# Patient Record
Sex: MALE | Race: WHITE
[De-identification: names, ages, dates, MRNs, and addresses within clinical notes are randomized per-mention and may not be internally consistent; named-entity substitution may affect disease eponyms.]

---

## 2017-03-30 ENCOUNTER — HOSPITAL ENCOUNTER (OUTPATIENT)
Dept: HOSPITAL 45 - C.LABBC | Age: 62
Discharge: HOME | End: 2017-03-30
Attending: INTERNAL MEDICINE
Payer: COMMERCIAL

## 2017-03-30 DIAGNOSIS — E78.5: ICD-10-CM

## 2017-03-30 DIAGNOSIS — I25.10: Primary | ICD-10-CM

## 2017-03-30 DIAGNOSIS — I10: ICD-10-CM

## 2017-03-30 LAB
ALBUMIN/GLOB SERPL: 0.9 {RATIO} (ref 0.9–2)
ALP SERPL-CCNC: 106 U/L (ref 45–117)
ALT SERPL-CCNC: 40 U/L (ref 12–78)
ANION GAP SERPL CALC-SCNC: 8 MMOL/L (ref 3–11)
AST SERPL-CCNC: 32 U/L (ref 15–37)
BASOPHILS # BLD: 0.02 K/UL (ref 0–0.2)
BASOPHILS NFR BLD: 0.2 %
BUN SERPL-MCNC: 29 MG/DL (ref 7–18)
BUN SERPL-MCNC: 30 MG/DL (ref 7–18)
BUN/CREAT SERPL: 24.5 (ref 10–20)
CALCIUM SERPL-MCNC: 9.7 MG/DL (ref 8.5–10.1)
CHLORIDE SERPL-SCNC: 105 MMOL/L (ref 98–107)
CHOLEST/HDLC SERPL: 3 {RATIO}
CO2 SERPL-SCNC: 25 MMOL/L (ref 21–32)
COMPLETE: YES
CREAT SERPL-MCNC: 1.2 MG/DL (ref 0.6–1.4)
CREAT SERPL-MCNC: 1.2 MG/DL (ref 0.6–1.4)
EOSINOPHIL NFR BLD AUTO: 283 K/UL (ref 130–400)
GLOBULIN SER-MCNC: 3.9 GM/DL (ref 2.5–4)
GLUCOSE SERPL-MCNC: 93 MG/DL (ref 70–99)
GLUCOSE UR QL: 48 MG/DL
HCT VFR BLD CALC: 41.1 % (ref 42–52)
IG%: 0.2 %
IMM GRANULOCYTES NFR BLD AUTO: 16.4 %
KETONES UR QL STRIP: 79 MG/DL
LYMPHOCYTES # BLD: 1.55 K/UL (ref 1.2–3.4)
MCH RBC QN AUTO: 32.4 PG (ref 25–34)
MCHC RBC AUTO-ENTMCNC: 35 G/DL (ref 32–36)
MCV RBC AUTO: 92.4 FL (ref 80–100)
MONOCYTES NFR BLD: 12.4 %
NEUTROPHILS # BLD AUTO: 0.8 %
NEUTROPHILS NFR BLD AUTO: 70 %
NITRITE UR QL STRIP: 90 MG/DL (ref 0–150)
PH UR: 145 MG/DL (ref 0–200)
PMV BLD AUTO: 11 FL (ref 7.4–10.4)
POTASSIUM SERPL-SCNC: 4.1 MMOL/L (ref 3.5–5.1)
RBC # BLD AUTO: 4.45 M/UL (ref 4.7–6.1)
SODIUM SERPL-SCNC: 138 MMOL/L (ref 136–145)
VERY LOW DENSITY LIPOPROT CALC: 18 MG/DL
WBC # BLD AUTO: 9.44 K/UL (ref 4.8–10.8)

## 2017-05-11 ENCOUNTER — HOSPITAL ENCOUNTER (OUTPATIENT)
Dept: HOSPITAL 45 - C.GI | Age: 62
Discharge: HOME | End: 2017-05-11
Attending: INTERNAL MEDICINE
Payer: COMMERCIAL

## 2017-05-11 VITALS
HEIGHT: 70.98 IN | BODY MASS INDEX: 28.06 KG/M2 | BODY MASS INDEX: 28.06 KG/M2 | WEIGHT: 200.42 LBS | HEIGHT: 70.98 IN | WEIGHT: 200.42 LBS

## 2017-05-11 VITALS — OXYGEN SATURATION: 97 % | HEART RATE: 48 BPM | SYSTOLIC BLOOD PRESSURE: 108 MMHG | DIASTOLIC BLOOD PRESSURE: 74 MMHG

## 2017-05-11 DIAGNOSIS — Z86.010: ICD-10-CM

## 2017-05-11 DIAGNOSIS — Z79.899: ICD-10-CM

## 2017-05-11 DIAGNOSIS — G47.30: ICD-10-CM

## 2017-05-11 DIAGNOSIS — D12.2: ICD-10-CM

## 2017-05-11 DIAGNOSIS — I10: ICD-10-CM

## 2017-05-11 DIAGNOSIS — K64.8: ICD-10-CM

## 2017-05-11 DIAGNOSIS — Z79.82: ICD-10-CM

## 2017-05-11 DIAGNOSIS — K57.30: ICD-10-CM

## 2017-05-11 DIAGNOSIS — Z80.0: ICD-10-CM

## 2017-05-11 DIAGNOSIS — E78.00: ICD-10-CM

## 2017-05-11 DIAGNOSIS — Z12.11: Primary | ICD-10-CM

## 2017-05-11 DIAGNOSIS — Z95.1: ICD-10-CM

## 2017-05-11 DIAGNOSIS — K21.9: ICD-10-CM

## 2017-05-11 NOTE — DISCHARGE INSTRUCTIONS
Endoscopy Patient Instructions


Date / Procedure(s) Performed


May 11, 2017.


Colonoscopy





Allergy Information


Coded Allergies:  


     No Known Allergies (Verified , 5/11/17)





Discharge Date / Findings


May 11, 2017.


Colon polyp 


Diverticulosis


Internal hemorrhoids





Provider Instructions





Activity Restrictions





-  No exercising or heavy lifting for 24 hours. 


-  Do not drink alcohol the day of the procedure.


-  Do not drive a car or operate machinery until the day after the procedure.


-  Do not make any important decisions or sign important papers in 24 hours 

after the procedure.





Following Day:





-  Return to full activity which may include returning to work/school.





Diet





Start your diet with liquids and light foods (jello, soup, juice, toast).  Then 

eat your usual diet if not nauseated.





Treatment For Common After Affects





For mild abdominal pain, bloating, or excessive gas:





-  Rest


-  Eat lightly


-  Lie on right side





Follow-Up Information


Follow-up with DR. SINCERE PLUMMER as scheduled





Anesthesia Information





What You Should Know





You have had a procedure that required some medicine to reduce anxiety and 

discomfort. This treatment is called moderate sedation.  


After receiving the treatment, you may be sleepy, but you will be able to 

breathe on your own.  The effects of the treatment may last for several hours.








Follow these instructions along with Activity/Diet recommendations noted above:





*  Do NOT do anything where dizziness or clumsiness would be dangerous.





*  Rest quietly at home today, then you can be up and about tomorrow.





*  Have a responsible person stay with you the rest of today.





*  You may have had an I.V. today.  If so, you may take the dressing off later 

today.





Recommendations


 


Call your doctor if:





*  Trouble breathing 





*  Continuous vomiting for more than 24 hours





*  Temperature above 101 degrees





*  Severe abdominal pain or bloating





*  Pain not relieved by pain medicine ordered





*  There is increased drainage or redness from any incision





*  A large amount of rectal bleeding greater than 2-3 tablespoons. 


   (If you had a polyp/s removed or have hemorrhoids, a small amount of blood -


    from the rectum is to be expected.)





*  You have any unanswered questions or concerns.





IN THE EVENT OF A SERIOUS EMERGENCY, GO TO THE NEAREST EMERGENCY ROOM





       Your discharge instructions were prepared by provider Александр Hernandez.


 Patient Instructions Signature Page








Matheus Feilds 











Patient (or Guardian) Signature/Date:____________________________________ I 

have read and understand the instructions given to me by my caregivers.








Caregiver/RN/Doctor Signature/Date:____________________________________








The above-named patient and/or guardian has received patient instructions on 

this date.


























+  Original Patient Signature Page (only) stays with chart.  Please make copy 

for patient.

## 2017-05-11 NOTE — GI REPORT
Procedure Date: 5/11/2017 2:26 PM

Procedure:            Colonoscopy

Indications:          High risk colon cancer surveillance: Personal history 

                      of colonic polyps

Medicines:            Monitored Anesthesia Care

Complications:        No immediate complications.

Estimated Blood Loss: Estimated blood loss: none.

Procedure:            Pre-Anesthesia Assessment:

                      - Prior to the procedure, a History and Physical was 

                      performed, and patient medications and allergies were 

                      reviewed. The patient's tolerance of previous 

                      anesthesia was also reviewed. The risks and benefits of 

                      the procedure and the sedation options and risks were 

                      discussed with the patient. All questions were 

                      answered, and informed consent was obtained. Prior 

                      Anticoagulants: The patient has taken aspirin, last 

                      dose was 1 day prior to procedure. ASA Grade 

                      Assessment: III - A patient with severe systemic 

                      disease. After reviewing the risks and benefits, the 

                      patient was deemed in satisfactory condition to undergo 

                      the procedure.

                      After I obtained informed consent, the scope was passed 

                      under direct vision. Throughout the procedure, the 

                      patient's blood pressure, pulse, and oxygen saturations 

                      were monitored continuously. The On-site loaner was 

                      introduced through the anus and advanced to the 

                      terminal ileum. The colonoscopy was performed without 

                      difficulty. The patient tolerated the procedure well. 

                      The quality of the bowel preparation was good. The 

                      terminal ileum, ileocecal valve, appendiceal orifice, 

                      and rectum were photographed.

Findings:

     A 3 mm polyp was found in the ascending colon. The polyp was sessile. 

     The polyp was removed with a cold snare. Resection and retrieval were 

     complete.

     Multiple small-mouthed diverticula were found in the sigmoid colon.

     Non-bleeding internal hemorrhoids were found during retroflexion. The 

     hemorrhoids were small.

Impression:           - One 3 mm polyp in the ascending colon, removed with a 

                      cold snare. Resected and retrieved.

                      - Diverticulosis in the sigmoid colon.

                      - Non-bleeding internal hemorrhoids.

Recommendation:       - Resume previous diet.

                      - Continue present medications.

                      - Repeat colonoscopy for surveillance based on 

                      pathology results.

                      - Return to GI office as previously scheduled.

Александр Hernandez, 

5/11/2017 3:00:09 PM

This report has been signed electronically.

Note Initiated On: 5/11/2017 2:26 PM

     I attest to the content of the Intraoperative Record and orders 

     documented therein, exceptions below

## 2017-05-11 NOTE — ANESTHESIOLOGY PROGRESS NOTE
Anesthesia Post Op Note


Date & Time


May 11, 2017 at 15:08





Vital Signs


Pain Intensity:  0





 Vital Signs Past 12 Hours








  Date Time  Temp Pulse Resp B/P Pulse Ox O2 Delivery O2 Flow Rate FiO2


 


5/11/17 14:59  50 12 104/61 97 Room Air  


 


5/11/17 14:01 36.7 53 16 138/78 95 Room Air  











Notes


Mental Status:  alert / awake / arousable, participated in evaluation


Pt Amnestic to Procedure:  Yes


Nausea / Vomiting:  adequately controlled


Pain:  adequately controlled


Airway Patency, RR, SpO2:  stable & adequate


BP & HR:  stable & adequate


Hydration State:  stable & adequate


Anesthetic Complications:  no major complications apparent


Pt doing well.

## 2017-05-11 NOTE — ENDO HISTORY AND PHYSICAL
History & Physical


Date of Service:


May 11, 2017.


Chief Complaint:


History of colon polyps


Referring Physician:


Wei Wotrhy


History of Present Illness


60 yo CM who presents for colonoscopy secondary to history of colon polyps.





Past Medical History


Reflux, High Cholesterol, Sleep Apnea, CABG, Hypertension





Past Surgical History


Hx Cardiac Surgery:  Yes (HEART CATH-NO STENTS, CABG-2 VESSELS AND MITRAL VALVE 

REPAIR)


Hx Internal Defibrillator:  No


Hx Pacemaker:  No


Hx Abdominal Surgery:  Yes (APPY, INGUINAL HERNIA)


Hx of Implantable Prosthesis:  No


Hx Post-Op Nausea and Vomiting:  No


Hx Cancer Surgery:  No


Hx Thoracic Surgery:  No


Hx Orthopedic:  Yes (RT RING FINGER AMPUTATION RECONSTRUCTION; RT KNEE 

ARTHROSCOPY, RT EBLOW SX)


Hx Urinary Tract Surgery:  Yes (GREENLIGHT TURP)





Family History


Colon CA





Social History


Smoking Status:  Never Smoker


Hx Substance Use:  No


Hx Alcohol Use:  Yes ("SOCIALLY")





Allergies


Coded Allergies:  


     No Known Allergies (Unverified , 5/3/17)





Current Medications





 Reported Home Medications








 Medications  Dose


 Route/Sig


 Max Daily Dose Days Date Category


 


 Diovan


  (Valsartan) 80 Mg


 Tab  80 Mg


 PO QAM


    5/3/17 Reported


 


 Amoxicillin 500


 Mg Cap  4 Cap


 PO UD PRN


    12/24/15 Reported


 


 Nitrostat


  (Nitroglycerin)


 0.4 Mg Tab  0.4 Mg


 UT PRN


    12/24/15 Reported


 


 Metoprolol


 Succinate ER


  (Metoprolol


 Succinate) 25 Mg


 Tabcr  25 Mg


 PO QAM


    12/24/15 Reported


 


 Crestor


  (Rosuvastatin


 Calcium) 20 Mg Tab  40 Mg


 PO HS


    12/24/15 Reported


 


 Aspir-81


  (Aspirin) 81 Mg


 Tab  81 Mg


 PO QAM


    3/26/13 Reported











Vital Signs


Weight (Kilograms):  90.91


Height (Feet):  5


Height (Inches):  11





Physical Exam


General Appearance:  WD/WN, no apparent distress


Respiratory/Chest:  


   Auscultation:  breath sounds normal


Cardiovascular:  


   Heart Auscultation:  RRR


Abdomen:  


   Bowel Sounds:  normal


   Inspection & Palpation:  soft, non-distended, no tenderness, guarding & 

rebound





Assessment and Plan


Assessment:


60 yo CM who presents for colonoscopy secondary to history of colon polyps.








Plan:


Proceed with colonoscopy.

## 2018-04-16 ENCOUNTER — HOSPITAL ENCOUNTER (OUTPATIENT)
Dept: HOSPITAL 45 - C.CTS | Age: 63
Discharge: HOME | End: 2018-04-16
Attending: INTERNAL MEDICINE
Payer: COMMERCIAL

## 2018-04-16 DIAGNOSIS — I77.810: Primary | ICD-10-CM

## 2018-04-16 DIAGNOSIS — E78.5: ICD-10-CM

## 2018-04-16 DIAGNOSIS — Z95.2: ICD-10-CM

## 2018-04-16 DIAGNOSIS — I10: ICD-10-CM

## 2018-04-16 DIAGNOSIS — Z95.5: ICD-10-CM

## 2018-04-16 DIAGNOSIS — R97.20: ICD-10-CM

## 2018-04-16 DIAGNOSIS — G47.33: ICD-10-CM

## 2018-04-16 DIAGNOSIS — M19.90: ICD-10-CM

## 2018-04-16 LAB
ALBUMIN SERPL-MCNC: 3.8 GM/DL (ref 3.4–5)
ALP SERPL-CCNC: 66 U/L (ref 45–117)
ALT SERPL-CCNC: 37 U/L (ref 12–78)
AST SERPL-CCNC: 22 U/L (ref 15–37)
BASOPHILS # BLD: 0.02 K/UL (ref 0–0.2)
BASOPHILS NFR BLD: 0.3 %
BUN SERPL-MCNC: 19 MG/DL (ref 7–18)
CALCIUM SERPL-MCNC: 9.4 MG/DL (ref 8.5–10.1)
CO2 SERPL-SCNC: 23 MMOL/L (ref 21–32)
CREAT SERPL-MCNC: 1.14 MG/DL (ref 0.6–1.4)
EOS ABS #: 0.07 K/UL (ref 0–0.5)
EOSINOPHIL NFR BLD AUTO: 265 K/UL (ref 130–400)
GLUCOSE SERPL-MCNC: 82 MG/DL (ref 70–99)
HCT VFR BLD CALC: 42 % (ref 42–52)
HGB BLD-MCNC: 14.3 G/DL (ref 14–18)
IG#: 0.01 K/UL (ref 0–0.02)
IMM GRANULOCYTES NFR BLD AUTO: 30.3 %
KETONES UR QL STRIP: 144 MG/DL
LYMPHOCYTES # BLD: 1.89 K/UL (ref 1.2–3.4)
MCH RBC QN AUTO: 31.1 PG (ref 25–34)
MCHC RBC AUTO-ENTMCNC: 34 G/DL (ref 32–36)
MCV RBC AUTO: 91.3 FL (ref 80–100)
MONO ABS #: 0.58 K/UL (ref 0.11–0.59)
MONOCYTES NFR BLD: 9.3 %
NEUT ABS #: 3.66 K/UL (ref 1.4–6.5)
NEUTROPHILS # BLD AUTO: 1.1 %
NEUTROPHILS NFR BLD AUTO: 58.8 %
PH UR: 238 MG/DL (ref 0–200)
PMV BLD AUTO: 10.7 FL (ref 7.4–10.4)
POTASSIUM SERPL-SCNC: 3.9 MMOL/L (ref 3.5–5.1)
PROT SERPL-MCNC: 7.7 GM/DL (ref 6.4–8.2)
RED CELL DISTRIBUTION WIDTH CV: 12.9 % (ref 11.5–14.5)
RED CELL DISTRIBUTION WIDTH SD: 43.2 FL (ref 36.4–46.3)
SODIUM SERPL-SCNC: 136 MMOL/L (ref 136–145)
WBC # BLD AUTO: 6.23 K/UL (ref 4.8–10.8)

## 2018-04-16 NOTE — DIAGNOSTIC IMAGING REPORT
CHEST COMBO ANGIOGRAPHY



CLINICAL HISTORY: 62 years-old Male presenting with 

^I77.810

^ASCENDING AORTA DILATATION, DILATED AORTIC ROOT. 



TECHNIQUE: Multidetector CT angiography of the chest was performed before and

after the administration of intravenous contrast. 3-D volumetric and/or maximum

intensity projection (MIP) images were subsequently reconstructed for review. IV

contrast: 120 mL of Optiray 320. A dose lowering technique was used consistent

with the principles of ALARA (as low as reasonably achievable).



COMPARISON: None.



CT DOSE (mGy.cm): The estimated cumulative dose is 1352.31 mGycm.



FINDINGS:



 topogram: Median sternotomy wires and prosthetic mitral valve noted.



Vasculature:



The study is adequate for assessment of the aorta. Precontrast imaging

demonstrates no evidence of intramural hematoma. Postcontrast imaging

demonstrates atherosclerosis and aneurysmal dilatation of the ascending aorta,

which measures 4.7 cm in diameter. The descending thoracic aorta is normal in

caliber. No evidence of dissection or penetrating ulcer. Allowing for timing of

the contrast bolus, no gross evidence of a filling defect within the pulmonary

arteries to suggest embolus. Main pulmonary artery is not enlarged. No

flattening of the interventricular septum. No intracardiac filling defect. No

reflux of contrast into the hepatic veins.



Remaining chest:



On soft tissue windows, normal thyroid and thoracic inlet. No axillary,

supraclavicular, hilar, or mediastinal lymphadenopathy. Postsurgical changes of

mitral valve replacement. Coronary artery calcification. Normal heart size.

Postsurgical changes of coronary artery bypass grafting. No pericardial or

pleural effusion. Upper abdomen normal.



On lung windows, minimal dependent changes likely atelectasis. No other focal

nodule or infiltrate. Airways patent.



On bone windows, degenerative changes of the spine.



IMPRESSION:

1.  Aneurysmal dilatation of the ascending and during aorta, which measures 4.7

cm in diameter. No evidence of acute aortic injury. No acute intrathoracic

pathology.



2.  Post surgical changes of coronary artery bypass grafting and mitral valve

replacement.







Electronically signed by:  Jamir Aragon M.D.

4/16/2018 1:47 PM



Dictated Date/Time:  4/16/2018 1:41 PM